# Patient Record
Sex: MALE | Race: WHITE | Employment: OTHER | URBAN - METROPOLITAN AREA
[De-identification: names, ages, dates, MRNs, and addresses within clinical notes are randomized per-mention and may not be internally consistent; named-entity substitution may affect disease eponyms.]

---

## 2020-06-10 PROBLEM — I10 HYPERTENSION: Status: ACTIVE | Noted: 2020-05-01

## 2020-06-10 PROBLEM — G20.A1 PARKINSON DISEASE (H): Status: ACTIVE | Noted: 2017-03-07

## 2020-06-10 PROBLEM — R00.1 BRADYCARDIA, SINUS: Status: ACTIVE | Noted: 2017-03-09

## 2020-06-10 PROBLEM — I24.89 DEMAND ISCHEMIA (H): Status: ACTIVE | Noted: 2020-05-28

## 2020-06-10 PROBLEM — E11.9 DIABETES MELLITUS, TYPE 2 (H): Status: ACTIVE | Noted: 2020-05-01

## 2020-06-10 PROBLEM — I48.92 ATRIAL FLUTTER (H): Status: ACTIVE | Noted: 2020-05-28

## 2020-06-10 PROBLEM — R79.89 ELEVATED TROPONIN: Status: ACTIVE | Noted: 2020-05-29

## 2020-06-10 PROBLEM — Z93.4 GASTROJEJUNOSTOMY TUBE STATUS (H): Status: ACTIVE | Noted: 2018-04-03

## 2020-06-10 PROBLEM — R42 EPISODIC LIGHTHEADEDNESS: Status: ACTIVE | Noted: 2017-03-09

## 2020-06-11 ENCOUNTER — TELEPHONE (OUTPATIENT)
Dept: NEUROLOGY | Facility: CLINIC | Age: 65
End: 2020-06-11

## 2020-06-11 NOTE — TELEPHONE ENCOUNTER
6/22/20 appointment with Dr. Martin for Deep Brain Stimulation. Left voice mail that Deep Brain Stimulation is an elective surgery and done on patients that are in relatively good health. I'm seeing from his chart that he has been hospitalized recently and has some significant health problems. I mentioned that it would be much better for him to have an evaluation through the [facility] system so they can manage both his Duopa pump and DEEP BRAIN STIMULATION should he qualify for it. I told him it is a long intensive process with neuropsychological testing, motor testing, MRI and neurosurgical evaluation. I stated I did not want to waste his time or cause undo expense by coming here for Deep Brain Stimulation if he does not meet minimum criteria. Asked him to call me back to discuss.

## 2020-06-16 ENCOUNTER — TELEPHONE (OUTPATIENT)
Dept: NEUROLOGY | Facility: CLINIC | Age: 65
End: 2020-06-16

## 2020-06-16 NOTE — TELEPHONE ENCOUNTER
Writer attempted to reach the patient to inform him that the appointment with Dr. Martin on 6/22/2020 needs to be cancelled as Dr. Martin does not work with patients who have a doupa pump.    See notes in previous encounters.    Lvroshan.    [facility]  Adult Med Spec/Surg Spec   [phone]

## 2020-06-16 NOTE — TELEPHONE ENCOUNTER
----- Message from Gisele Medrano RN sent at 6/16/2020 10:50 AM CDT -----  Regarding: FW: duopa pump patient    ----- Message -----  From: Sara Arias RN  Sent: 6/16/2020  10:02 AM CDT  To: [facility] Neurology Adult [place]  Subject: FW: duopa pump patient                           [place] patient - Please call patient and cancel appointment. We will not be managing his duopa pump and [facility] can assess him for Deep Brain Stimulation which is in the same system as [facility]. I left him a voice mail last week explaining this but he has not called back. From reading his health record, he is not a viable candidate for elective brain surgery.    Thank you,  Sara  ----- Message -----  From: Juan Manuel Bruce MD  Sent: 6/16/2020   9:53 AM CDT  To: Malik Martin MD, #  Subject: RE: duopa pump patient                           Dr. Quinn has a lot more experience than we do with Duopa.  We would not get involved with his pump.  ----- Message -----  From: Malik Martin MD  Sent: 6/10/2020  11:44 AM CDT  To: Juan Manuel Bruce MD, #  Subject: duopa pump patient                               This patient has a duopa pump and is being managed by Dr. Quinn - last seen 5/20/2020 by her - on my schedule for June 2020. Wondering what we have been doing with these types of referrals.    Note below from Dr. Quinn.        THis 65 yo pt returns for 1 month followup. At the last visit, he was having ongoing issues with his GJ tube. It is not clear to me whether he had it changed or not, but he had a brief hospital admission in early May for fecal impaction. For some reason he told his PCP that I told him not to take senna or Miralax, which I certainly don't recall, unless it was at a time when he was having loose stools. These are the drugs that we do most commonly use to treat chronic constipation in Parkinson's disease. He is drinking more water, eating smaller meals more often, using flax seed  "oil and aloe vera.     He only has 30-60 minute periods of mobility, and then feels that he is stiff, avoids taking extra pills, but sometimes gives himself Duopa bumps that help for 10 minutes. They time this worsening to when his GJ tube was replaced from the stomach into the intestine. He describes moving 100 pound boxes when he is \"on\", but says that he can't move his arms at all right now as I am seeing him     Past Medical History Update:   Past Medical History:   Diagnosis Date     Arthropathy of shoulder region 3/29/2006   Rotator Cuff Tear Arthropathy     Bradycardia, sinus 3/9/2017     Diabetes mellitus, type 2 ([facility]) 5/1/2020     Dyspnea on exertion 3/9/2017     Edema of both legs 3/7/2017     Episodic lightheadedness 3/9/2017     Gastrojejunostomy tube status ([facility]) 4/3/2018     Hypertension ([facility]) 5/1/2020     Myocardiopathy ([facility])     Parkinson disease ([facility])     Sleep apnea 3/29/2006   Obstructive Sleep Apnea Hypopnea     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   Allergies   Allergen Reactions     Flexeril [Cyclobenzaprine] Myalgias   Pt stated the medication made his pain worse.     Current Medications:   Reviewed today and updated on Health Profile in Epic.  Outpatient Medications Prior to Visit   Medication Sig Dispense Refill     Aloe Take 1 Dose by mouth every other day. Whole plant oil     Amantadine HCl ER (GOCOVRI) 137 MG CP24 Take 1 Capsule by mouth daily at bedtime.     ammonium lactate (AMLACTIN) 12 % cream Apply to affected areas twice daily as needed for dry skin. 385 g 3     Ascorbic Acid (VITAMIN C OR) Take 1 Tablet by mouth every other day.     Carbidopa-Levodopa (DUOPA) 4.63-20 MG/ML SUSP Infuse 2 cassettes over 24 hours via PEG-J and [employer] 1400 pump.  Morning Dose: 5.0 ml, with range up " to 5.0 ml  Morning lock-out: 21 hours  Continuous Dose: 3.5 ml/hr daytime and 2.5-3.0 ml/hr at bedtime, with range up to 4.2 ml/hr  Extra Dose: 2.5 ml, with range up to 2.5 ml  Extra Dose Lock-Out: 2 hours  Lock Level 1    Dispense 28 day supply of single use 100ml cassettes, syringes, ancillary supplies, and medical equipment necessary to administer medication Indications: Parkinson's Disease 28 [address]     carbidopa-levodopa (SINEMETCR)  MG controlled release tablet Take 0.5 Tablets by mouth 4 times daily as needed. (Patient taking differently: Take 0.5 Tablets by mouth two times daily as needed.) 180 Tablet 3     cholecalciferol (VITAMIND3) 2000 UNITS tablet Take 2,000 Units by mouth daily.     Cyanocobalamin (B-12) 1000 MCG TBCR Take 1,000 mcg by mouth daily. Indications: Inadequate Vitamin B12     diazePAM (VALIUM) 5 MG tablet Take 2 Tablets by mouth daily at bedtime. (Patient taking differently: Take 5 mg by mouth daily at bedtime.) 60 Tablet 5     Flaxseed, Linseed, (FLAXSEED OIL OR) Take 1 Dose by mouth every other day.     Magnesium Oxide 400 MG Take 400 mg by mouth daily. Indications: Mg deficiency     multivitamin (THERAGRAN) tablet Take 1 Tablet by mouth daily.     polyethylene glycol (MIRALAX) 17 g packet Take 1 Packet by mouth two times a day. (Patient not taking: Reported on 5/20/2020) 100 Each 3     Amantadine HCl ER (GOCOVRI) 137 MG CP24 Take 2 Capsules by mouth daily at bedtime. (Patient taking differently: Take 1 Capsule by mouth daily at bedtime.) 180 Capsule 3     No facility-administered medications prior to visit.     Family History Update:   Family History   Problem Relation Age of Onset     Diabetes, Type II Birth Mother     Social History Update:   He lives in a house with his wife Dian, who has orthopedic issues herself  Review of Systems:   Bowels are moving 3x a week, and he is sleeping 7 hours/night  Falls since last visit?   He has had several falls without injury    OBJECTIVE      Vital Signs: (includes sitting and standing blood pressure)  There were no vitals filed for this visit.   Unified Parkinson's Disease Rating Scale (Part 2) Functional Score (maximum 52): not completed  General:   He is clean and appears generally healthy but tired, sitting in the recliner  Neurologic exam:   No tremor  He says he can't move his arms at all (they are resting on the arms of his recliner); at the end, he weakly lifted the L arm a little to wave goodbye  Voice was normal  I did not try to get him up to walk    ASSESSMENT/PLAN     Stage 4 Parkinson's disease with dramatic fluctuation from being able to lift 100pound boxes to unable to move, at 15 minute intervals. They also have the sense that various preparations of levodopa and various other medications had dramatically negative effects (it took him 4 days to get over the cramping after taking 1 sinemet pill, but he runs Duopa at 2.5 ml/hour. Inbrija, similarly, caused dramatic stiffness). I'm not sure that I have any good alternatives to avoid that issue. He is on amantadine, and has taken dopa agonists and I believe COMT inhibitors in the past--I will double check about entacapone. He was interested in home care PT and OT, so I will write for that. He is homebound at this point because of the need for an assistive device much of the time and dramatic fluctuation in function from moment to moment. I will see him back in 2 months.     Total time/ Counseling time: 30 min, 20 in discussion  Send a copy of note to:   Frandy Quinn MD 4:17 PM 5/20/2020        Electronically signed by Brandy Quinn MD at 05/20/2020 4:49 PM CDT

## 2020-07-02 ENCOUNTER — TELEPHONE (OUTPATIENT)
Dept: NEUROLOGY | Facility: CLINIC | Age: 65
End: 2020-07-02

## 2020-07-02 NOTE — TELEPHONE ENCOUNTER
"Keith stated the Duopa pump is not working well for him anymore and it continues to break often. He  would like Deep Brain Stimulation to improve dystonia, specifically more so in the afternoons. The duopa pump works well in the morning and he is able to do yard work and exercise. After lunch he is confined to a chair.    He reports he is not getting the information he needs on Deep Brain Stimulation from [facility]. He has heard great things from friends about our program and Dr. Mario peterson. He admits he has not pursued talking to Dr. Payne at [facility] about Deep Brain Stimulation for dystonia. \"I don't want it to seem like I am going against Dr. Quinn's back.\" I encouraged him it is always okay to get a second opinion especially when it comes to Deep Brain Stimulation. He will consider talking with Dr. Payne. He has many questions on the effectiveness of Deep Brain Stimulation for Dystonia. I informed him I will have Sara call him back as she has more of knowledge on Deep Brain Stimulation for dystonia. Keith did reference that the question has been raised regarding if he actually has Parkinson's disease - he thinks he may not.    9 minute phone call  "

## 2020-07-02 NOTE — TELEPHONE ENCOUNTER
[facility] Call Center    Phone Message    May a detailed message be left on voicemail: yes     Reason for Call: Other: Keith calling to request a call back to finish up a conversation that was started an had to be cut short. Please call him back at your earliest convenience.     Action Taken: Message routed to:  [facility] ([facility]):  NEUROLOGY    Travel Screening: Not Applicable

## 2020-07-07 NOTE — TELEPHONE ENCOUNTER
"Patient is having great trouble making a decision as to whether he will make an appointment with Dr. Rubens Payne, and risk going behind Dr. Quinn's back, or coming here to have a consult for Deep Brain Stimulation. I encouraged him to make an appointment with Dr. Payne because ultimately, Dr. Payne would be able to manage his Parkinson's disease, his duopa pump and his Deep Brain Stimulation if he were When he first got the duopa pump it worked well for his gait. I clearly let him know that it is his right to get another opinion from any provider he chooses and that it is done all the time. He is feeling \"disloyal\" and does not want to \"betray\" Dr. Quinn.    He is unable to function in the afternoon after 3-4 pm.  He is wondering if Deep Brain Stimulation works for dystonia. I am unclear if what he is describing is dystonia. He describes an overall weakness that hits him after 3 pm and he is done for the day. He keeps his duopa pump on 24 hours which is not typical.    He said he was in the hospital in last 3 weeks 5 times due to the pump.    25 minute call.  "